# Patient Record
Sex: MALE | Race: WHITE | NOT HISPANIC OR LATINO
[De-identification: names, ages, dates, MRNs, and addresses within clinical notes are randomized per-mention and may not be internally consistent; named-entity substitution may affect disease eponyms.]

---

## 2017-04-11 ENCOUNTER — APPOINTMENT (OUTPATIENT)
Dept: BARIATRICS | Facility: CLINIC | Age: 52
End: 2017-04-11

## 2017-04-11 VITALS
SYSTOLIC BLOOD PRESSURE: 128 MMHG | WEIGHT: 171.96 LBS | BODY MASS INDEX: 26.06 KG/M2 | DIASTOLIC BLOOD PRESSURE: 75 MMHG | HEIGHT: 68 IN | HEART RATE: 73 BPM

## 2018-04-09 ENCOUNTER — APPOINTMENT (OUTPATIENT)
Dept: BARIATRICS | Facility: CLINIC | Age: 53
End: 2018-04-09
Payer: COMMERCIAL

## 2018-04-09 ENCOUNTER — OUTPATIENT (OUTPATIENT)
Dept: OUTPATIENT SERVICES | Facility: HOSPITAL | Age: 53
LOS: 1 days | End: 2018-04-09
Payer: COMMERCIAL

## 2018-04-09 VITALS
SYSTOLIC BLOOD PRESSURE: 122 MMHG | HEART RATE: 61 BPM | DIASTOLIC BLOOD PRESSURE: 74 MMHG | WEIGHT: 175 LBS | HEIGHT: 68 IN | OXYGEN SATURATION: 99 % | BODY MASS INDEX: 26.52 KG/M2

## 2018-04-09 DIAGNOSIS — E66.3 OVERWEIGHT: ICD-10-CM

## 2018-04-09 DIAGNOSIS — Z98.84 BARIATRIC SURGERY STATUS: ICD-10-CM

## 2018-04-09 PROCEDURE — 74220 X-RAY XM ESOPHAGUS 1CNTRST: CPT

## 2018-04-09 PROCEDURE — 99214 OFFICE O/P EST MOD 30 MIN: CPT

## 2018-04-09 PROCEDURE — 74220 X-RAY XM ESOPHAGUS 1CNTRST: CPT | Mod: 26

## 2020-02-04 ENCOUNTER — OUTPATIENT (OUTPATIENT)
Dept: OUTPATIENT SERVICES | Facility: HOSPITAL | Age: 55
LOS: 1 days | End: 2020-02-04
Payer: COMMERCIAL

## 2020-02-04 ENCOUNTER — APPOINTMENT (OUTPATIENT)
Dept: BARIATRICS | Facility: CLINIC | Age: 55
End: 2020-02-04
Payer: COMMERCIAL

## 2020-02-04 VITALS
HEIGHT: 68 IN | WEIGHT: 197.75 LBS | DIASTOLIC BLOOD PRESSURE: 65 MMHG | SYSTOLIC BLOOD PRESSURE: 119 MMHG | BODY MASS INDEX: 29.97 KG/M2 | OXYGEN SATURATION: 98 % | HEART RATE: 56 BPM

## 2020-02-04 DIAGNOSIS — E66.3 OVERWEIGHT: ICD-10-CM

## 2020-02-04 DIAGNOSIS — Z98.84 BARIATRIC SURGERY STATUS: ICD-10-CM

## 2020-02-04 PROCEDURE — 99214 OFFICE O/P EST MOD 30 MIN: CPT | Mod: 25

## 2020-02-04 PROCEDURE — 74220 X-RAY XM ESOPHAGUS 1CNTRST: CPT | Mod: 26

## 2020-02-04 PROCEDURE — 74220 X-RAY XM ESOPHAGUS 1CNTRST: CPT

## 2020-02-04 PROCEDURE — S2083 ADJUSTMENT GASTRIC BAND: CPT

## 2020-02-05 NOTE — HISTORY OF PRESENT ILLNESS
[Date of Surgery: ___] : Date of Surgery:   [unfilled] [Surgeon Name:   ___] : Surgeon Name: Dr. SNIDER [Pre-Op Weight ___] : Pre-op weight was [unfilled] lbs [Procedure: ___] : Procedure performed: [unfilled]  [de-identified] : 55 yo  M s/p lap band surgery APL - with greater curvature plication. Weight gain since last visit on 4/09/2018. Denies any reflux symptoms. No change in BM. No nausea or vomiting. Pt reports an occasional stuck episode with solid food if eating too fast or not chewing ( 1-2 x per month). No food intolerances.Plan to increase daily exercise incorporating  - cardio and strength training.Pt is consuming 3 meals per day and is following nutritional guidelines and believes he is consuming enough daily liquid and protein per day. Denies port site pain. Pt has never had a lap band adjustment post surgery. Pt is requesting a slight lap band adjustment due to decrease in level of restriction and level of satiety between meals.  [de-identified] : Highest weight- 368 lbs

## 2020-02-05 NOTE — PROCEDURE
[FreeTextEntry1] : LAP-BAND adjustment was performed today. Using standard sterile technique, a 20 - gauge Oden needle was inserted into the LAP- BAND port.\par \par The patient was seated erect and tolerated water test without any symptoms of reflux, dysphagia or regurgitation. \par \par Routine post adjustment nutritional counseling was provided. This patient should adhere to a liquid diet for the next 24- 48 hours advancing one stage per day to regular as tolerated.\par \par Under sterile conditions, the port was accessed without complication using manual guidance and non coring needle. On access, fluid was found/measured and adjustment performed without complication. Amount was aspirated again and instilled to confirm placement.  Patient was counseled post adjustment regarding dietary recommendations. Discussed with patient to call or return to the office if any symptoms of over restriction, reflux or other concerns.\par

## 2020-02-05 NOTE — ASSESSMENT
[FreeTextEntry1] : 54 year old M s/p lap band surgery APS- here for follow up visit. Weight gain  since last visit.Decrease in level of restriction from lap band and level of satiety between meals.Occasional stuck episode with solid protein if eating too fast or not chewing fully. \par \par Video Esophagram performed today - No evidence of prolapse. No evidence of obstruction. Horizontal orientation of the lap band. \par \par Band adjustment performed today.Pt instructed to follow liquid diet for next 24- 48 hours then soft foods and advance to solids as tolerated. 1 cc + 1 cc= 2 cc\par \par Nutritional counseling has been provided. The patient is encouraged to remain calorie conscious and continue a low fat, low carbohydrate, protein focus diet. Pt encouraged to participate in a daily exercise regimen incorporating cardio and  strength training. Encouraged to keep a food journal. Discussed being more cognizant - how fast he is eating / chewing food more fully. \par \par Return to office in 2-3 MONTHS or earlier if any concerns. \par

## 2020-02-05 NOTE — DATA REVIEWED
[FreeTextEntry1] :                  1 / 1 Feliberto Ramirez         Report date: 2/4/2020 View Order    (Report matches study selected on Patient History pane)          EXAM: XR ESOPH SNGL CON STUDY       PROCEDURE DATE: 02/04/2020     INTERPRETATION:  EXAM: ESOPHAGUS       PROCEDURE DATE: 2/4/2020   INTERPRETATION: Clinical information: Morbid obesity, bariatric surgery  status   Comparison: 4/9/2018    view demonstrates lap band left upper quadrant, lap band port, left  lower quadrant. This exam was video recorded. Surgical clips present right  upper quadrant   The lap band is horizontally oriented.   Under fluoroscopic observation the patient ingested barium. There was no  evidence of obstruction. Barium flowed through the esophagus, passed the lap  band, and filled the stomach.   There was no evidence of prolapse.   IMPRESSION:   No evidence of prolapse.   No evidence of obstruction.   Horizontal orientation of the lap band.             FELIBERTO STOCK M.D., ATTENDING RADIOLOGIST  This document has been electronically signed. Feb 4 2020 11:01AM

## 2020-02-05 NOTE — REVIEW OF SYSTEMS
[Recent Change In Weight] : ~T recent weight change [Negative] : Heme/Lymph [Fever] : no fever [Chills] : no chills [Dysphagia] : no dysphagia [Hoarseness] : no hoarseness [Chest Pain] : no chest pain [Palpitations] : no palpitations [Shortness Of Breath] : no shortness of breath [Wheezing] : no wheezing [Abdominal Pain] : no abdominal pain [Vomiting] : no vomiting [Diarrhea] : no diarrhea [FreeTextEntry2] : weight gain since last visit. [Reflux/Heartburn] : no reflex/heartburn

## 2020-02-08 ENCOUNTER — TRANSCRIPTION ENCOUNTER (OUTPATIENT)
Age: 55
End: 2020-02-08

## 2023-07-25 ENCOUNTER — NON-APPOINTMENT (OUTPATIENT)
Age: 58
End: 2023-07-25

## 2023-08-02 ENCOUNTER — APPOINTMENT (OUTPATIENT)
Dept: BARIATRICS | Facility: CLINIC | Age: 58
End: 2023-08-02
Payer: COMMERCIAL

## 2023-08-02 ENCOUNTER — OUTPATIENT (OUTPATIENT)
Dept: OUTPATIENT SERVICES | Facility: HOSPITAL | Age: 58
LOS: 1 days | End: 2023-08-02
Payer: COMMERCIAL

## 2023-08-02 VITALS
OXYGEN SATURATION: 97 % | HEART RATE: 62 BPM | SYSTOLIC BLOOD PRESSURE: 120 MMHG | WEIGHT: 258 LBS | TEMPERATURE: 97 F | BODY MASS INDEX: 39.1 KG/M2 | HEIGHT: 68 IN | DIASTOLIC BLOOD PRESSURE: 72 MMHG

## 2023-08-02 DIAGNOSIS — E46 UNSPECIFIED PROTEIN-CALORIE MALNUTRITION: ICD-10-CM

## 2023-08-02 DIAGNOSIS — Z98.84 BARIATRIC SURGERY STATUS: ICD-10-CM

## 2023-08-02 PROCEDURE — 74220 X-RAY XM ESOPHAGUS 1CNTRST: CPT | Mod: 26

## 2023-08-02 PROCEDURE — 99214 OFFICE O/P EST MOD 30 MIN: CPT | Mod: 25

## 2023-08-02 PROCEDURE — S2083 ADJUSTMENT GASTRIC BAND: CPT

## 2023-08-02 PROCEDURE — 99401 PREV MED CNSL INDIV APPRX 15: CPT

## 2023-08-02 PROCEDURE — 74220 X-RAY XM ESOPHAGUS 1CNTRST: CPT

## 2023-08-02 RX ORDER — UBIQUINOL 100 MG
CAPSULE ORAL
Refills: 0 | Status: DISCONTINUED | COMMUNITY
End: 2023-08-02

## 2023-08-02 RX ORDER — BACILLUS COAGULANS/INULIN 1B-250 MG
CAPSULE ORAL
Refills: 0 | Status: DISCONTINUED | COMMUNITY
End: 2023-08-02

## 2023-08-10 NOTE — ASSESSMENT
[FreeTextEntry1] : 58 year old M is s/p Lap band placement with greater curvature plication presents to the office to get back on track. Weight gain since last visit in 2020, attributing to not good food choices. Since last band adjustment in 2020, reports no stuck episodes, reflux, nausea or vomiting. Doing well overall.  VE today demonstrated no obstruction, prolapse or delayed contrast time, but there is mild reflux appreciated  Band adjustment performed today Routine post adjustment nutritional counseling was provided  Greater than 15 minutes spent with pt discussing importance of health maintenance principles including dietary and lifestyle changes as well as long-term weight maintenance. Adhere to a liquid diet for the next 24 hours advancing per day to soft foods then regular as tolerated Encouraged better food choices - maintain food log Zero calorie liquid intake 64 oz per day  Encouraged to participate in a daily exercise regimen incorporating cardio and strength training Track steps - goal approximately 8-10k steps per day Exercises reviewed with pt  Discussed with patient to call the office if any symptoms of over restriction, reflux or other concerns Return to office in next month Follow up with Nutritionist   Pt seen with Dr. Connelly   Additional time spent before and after visit reviewing chart

## 2023-08-10 NOTE — PHYSICAL EXAM
[Normal] : affect appropriate [de-identified] : NC/AT, NAD, well, healthy appearing male [de-identified] : Eyeglasses, EOMI, no conjunctival injection, anicteric  [de-identified] : No visualized JVD or audible bruits  [de-identified] : Equal chest rise, non-labored respirations, no audible wheezing  [de-identified] : Regular rate, no audible carotid bruits or JVD appreciated  [de-identified] : soft, NT, ND, no diastasis or hernias appreciated, incisions from lap band and cholecystectomy well healed port to LUE without erythema, swelling, or tenderness [de-identified] : CINTHYA

## 2023-08-10 NOTE — HISTORY OF PRESENT ILLNESS
[Procedure: ___] : Procedure performed: [unfilled]  [Date of Surgery: ___] : Date of Surgery:   [unfilled] [Surgeon Name:   ___] : Surgeon Name: Dr. SNIDER [Pre-Op Weight ___] : Pre-op weight was [unfilled] lbs [de-identified] : 58 year old M is s/p Lap band placement with greater curvature plication presents to the office to get back on track. Weight gain since last visit in 2020, attributing to not good food choices. Since last band adjustment in 2020, reports no stuck episodes, reflux, nausea or vomiting. Pt had VE earlier today.  Consuming 2-3 meals/day, skipping lunch usually due to busy work schedule and eating too fast. Pt reports snacking after dinner on cake, cookies, or ice cream because will then wake up hungry in the middle of the night. Drinking 32oz/day of zero calorie liquids. 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 Sleeping well. No abdominal pain, constipation or diarrhea.  [de-identified] : Highest weight- 368 lbs  Lap cholecystectomy 2015

## 2023-08-10 NOTE — PROCEDURE
[FreeTextEntry1] : BAND adjustment was performed today. Using standard sterile technique, a 20 - gauge Oden needle was inserted into the LAP- BAND port.  Under sterile conditions, the port was accessed without complication using manual guidance and non-coring needle. On access, approximately 0.5cc of fluid was found/measured. Adjustment was performed without complication. Amount of 1.5cc was filled into band, then aspirated again and instilled to confirm placement. Pt tolerated drinking water after procedure without any difficulty - no reflux, dysphagia or regurgitation.

## 2023-09-19 ENCOUNTER — APPOINTMENT (OUTPATIENT)
Dept: BARIATRICS | Facility: CLINIC | Age: 58
End: 2023-09-19
Payer: COMMERCIAL

## 2023-09-19 VITALS
TEMPERATURE: 96.7 F | OXYGEN SATURATION: 99 % | SYSTOLIC BLOOD PRESSURE: 120 MMHG | HEIGHT: 68 IN | BODY MASS INDEX: 36.37 KG/M2 | HEART RATE: 67 BPM | WEIGHT: 240 LBS | DIASTOLIC BLOOD PRESSURE: 74 MMHG

## 2023-09-19 DIAGNOSIS — E66.9 OBESITY, UNSPECIFIED: ICD-10-CM

## 2023-09-19 PROCEDURE — 99214 OFFICE O/P EST MOD 30 MIN: CPT

## 2023-09-20 ENCOUNTER — APPOINTMENT (OUTPATIENT)
Dept: BARIATRICS/WEIGHT MGMT | Facility: CLINIC | Age: 58
End: 2023-09-20
Payer: COMMERCIAL

## 2023-09-20 VITALS — WEIGHT: 240 LBS | HEIGHT: 68 IN | BODY MASS INDEX: 36.37 KG/M2

## 2023-09-20 DIAGNOSIS — R63.5 ABNORMAL WEIGHT GAIN: ICD-10-CM

## 2023-09-20 PROCEDURE — 97802 MEDICAL NUTRITION INDIV IN: CPT

## 2023-12-12 ENCOUNTER — APPOINTMENT (OUTPATIENT)
Dept: BARIATRICS | Facility: CLINIC | Age: 58
End: 2023-12-12
Payer: COMMERCIAL

## 2023-12-12 VITALS
HEART RATE: 81 BPM | WEIGHT: 216 LBS | DIASTOLIC BLOOD PRESSURE: 76 MMHG | SYSTOLIC BLOOD PRESSURE: 120 MMHG | OXYGEN SATURATION: 98 % | HEIGHT: 68 IN | BODY MASS INDEX: 32.74 KG/M2 | TEMPERATURE: 96.7 F

## 2023-12-12 DIAGNOSIS — E66.9 OBESITY, UNSPECIFIED: ICD-10-CM

## 2023-12-12 DIAGNOSIS — R63.4 ABNORMAL WEIGHT LOSS: ICD-10-CM

## 2023-12-12 PROCEDURE — 99214 OFFICE O/P EST MOD 30 MIN: CPT

## 2023-12-13 PROBLEM — R63.4 WEIGHT LOSS: Status: ACTIVE | Noted: 2023-12-13

## 2023-12-13 PROBLEM — E66.9 OBESITY: Status: ACTIVE | Noted: 2023-08-02

## 2023-12-13 NOTE — HISTORY OF PRESENT ILLNESS
[de-identified] : 58 year old man s/p Lap-Band APS (2014) presents for follow-up. 24 lb weight loss since last visit in September; 2 cc fluid in Band; reports increased restriction and smaller portion sizes since last Band fill in August 2023. Reports no abdominal pain, nausea/vomiting, constipation, diarrhea, reflux/heartburn; reports rare stuck episodes with dry chicken. Patient eating 3 protein-rich meals/day, drinking adequate zero-calorie fluids/day, taking MVI, and walking 7-8k steps/day.  Last nutritionist appt was 9/20/23 [de-identified] : Highest weight- 368 lbs  Lap cholecystectomy 2015

## 2023-12-13 NOTE — ASSESSMENT
[FreeTextEntry1] : 58 year old man s/p Lap-Band (2014) presents for follow-up. 24 lb weight loss since last visit in September; reports smaller portion sizes and increased restriction since last Band fill in August 2023. Nutrition and exercise guidelines reviewed with the patient.    Continue 3 protein-focused meals/day (aim for 60 g - 70 g protein/day); avoid dry foods and chew thoroughly to limit number of stuck episodes Encourage zero calorie fluid intake (64 oz/day) Continue MVI Exercise with cardio (aim for 8k-10k steps/day), and strength training 2x/week Use step counter Weigh yourself 1x-2x/week Try the Calm rubén or Soothing Pod rubén for stress management Follow-up with nutritionist (keep a food log) Follow-up in 3 months All questions answered   Call with any questions or concerns   Time before and after visit spent reviewing chart

## 2023-12-13 NOTE — PHYSICAL EXAM
[de-identified] : No  [de-identified] : soft, NT, ND, no diastasis or hernias appreciated, incisions from lap band and cholecystectomy well healed port to LUE without erythema, swelling, or tenderness

## 2023-12-13 NOTE — REVIEW OF SYSTEMS
[Fever] : no fever [Chills] : no chills [Night Sweats] : no night sweats [Fatigue] : no fatigue [Abdominal Pain] : no abdominal pain [Vomiting] : no vomiting [Constipation] : no constipation [Diarrhea] : no diarrhea [Reflux/Heartburn] : no reflex/heartburn [Hernia] : no hernia [FreeTextEntry2] : 25 lb weight loss since 9/20/23 (2 cc in Lap-Band) [FreeTextEntry7] : rare stuck episodes with dry chicken

## 2024-03-14 ENCOUNTER — APPOINTMENT (OUTPATIENT)
Dept: BARIATRICS | Facility: CLINIC | Age: 59
End: 2024-03-14
Payer: COMMERCIAL

## 2024-03-14 VITALS
DIASTOLIC BLOOD PRESSURE: 90 MMHG | SYSTOLIC BLOOD PRESSURE: 130 MMHG | BODY MASS INDEX: 31.37 KG/M2 | HEIGHT: 68 IN | TEMPERATURE: 97.9 F | HEART RATE: 77 BPM | OXYGEN SATURATION: 97 % | WEIGHT: 207.01 LBS

## 2024-03-14 DIAGNOSIS — Z98.84 BARIATRIC SURGERY STATUS: ICD-10-CM

## 2024-03-14 PROCEDURE — 99214 OFFICE O/P EST MOD 30 MIN: CPT

## 2024-03-20 NOTE — PHYSICAL EXAM
[Normal] : affect appropriate [de-identified] : No  [de-identified] : soft, NT, ND, no diastasis or hernias appreciated, incisions from lap band and cholecystectomy well healed port to LUE without erythema, swelling, or tenderness

## 2024-03-20 NOTE — HISTORY OF PRESENT ILLNESS
[Procedure: ___] : Procedure performed: [unfilled]  [Date of Surgery: ___] : Date of Surgery:   [unfilled] [Surgeon Name:   ___] : Surgeon Name: Dr. SNIDER [Pre-Op Weight ___] : Pre-op weight was [unfilled] lbs [de-identified] : Highest weight- 368 lbs  Lap cholecystectomy 2015 [de-identified] : 58-year-old man s/p Lap-Band APL with greater curvature plication presents for follow-up. 9 lb weight loss since last visit. Reports no abdominal pain, nausea/vomiting, constipation, diarrhea, nor reflux/heartburn; reports occasional stuck episodes with textured foods (e.g. eggplant skin). Patient eating 3 protein-rich meals/day, drinking adequate zero-calorie fluids/day, taking MVI, and exercising.   Last nutritionist appt was 9/20/23

## 2024-03-20 NOTE — ASSESSMENT
[FreeTextEntry1] : 58-year-old man s/p Lap-Band APL with greater curvature plication presents for follow-up. 9 lb weight loss since last visit. Nutrition and exercise guidelines reviewed with the patient.    Continue 3 protein-focused meals/day (aim for 60 g - 70 g protein/day) Encourage zero calorie fluid intake (64 oz/day) Continue MVI Try to chew food thoroughly before swallowing due to occasional stuck episodes/dysphagia Exercise with cardio (aim for 8k-10k steps/day) and strength training 2-3x/week Use step counter Weigh yourself 1x-2x/week Try the Calm rbuén or Soothing Pod rubén for stress management Follow-up with nutritionist (keep a food log) Follow-up in 6 months  All questions answered   Call with any questions or concerns   Time before and after visit spent reviewing chart

## 2024-03-20 NOTE — PHYSICAL EXAM
[Normal] : affect appropriate [de-identified] : No  [de-identified] : soft, NT, ND, no diastasis or hernias appreciated, incisions from lap band and cholecystectomy well healed port to LUE without erythema, swelling, or tenderness

## 2024-03-20 NOTE — REVIEW OF SYSTEMS
[Patient Intake Form Reviewed] : Patient intake form was reviewed [Negative] : Allergic/Immunologic [Abdominal Pain] : no abdominal pain [Diarrhea] : no diarrhea [Constipation] : no constipation [Hernia] : no hernia [Reflux/Heartburn] : no reflex/heartburn [FreeTextEntry7] : reports occasional stuck episodes with textured foods

## 2024-03-20 NOTE — HISTORY OF PRESENT ILLNESS
[Procedure: ___] : Procedure performed: [unfilled]  [Date of Surgery: ___] : Date of Surgery:   [unfilled] [Surgeon Name:   ___] : Surgeon Name: Dr. SNIDER [Pre-Op Weight ___] : Pre-op weight was [unfilled] lbs [de-identified] : Highest weight- 368 lbs  Lap cholecystectomy 2015 [de-identified] : 58-year-old man s/p Lap-Band APL with greater curvature plication presents for follow-up. 9 lb weight loss since last visit. Reports no abdominal pain, nausea/vomiting, constipation, diarrhea, nor reflux/heartburn; reports occasional stuck episodes with textured foods (e.g. eggplant skin). Patient eating 3 protein-rich meals/day, drinking adequate zero-calorie fluids/day, taking MVI, and exercising.   Last nutritionist appt was 9/20/23

## 2024-03-20 NOTE — ASSESSMENT
[FreeTextEntry1] : 58-year-old man s/p Lap-Band APL with greater curvature plication presents for follow-up. 9 lb weight loss since last visit. Nutrition and exercise guidelines reviewed with the patient.    Continue 3 protein-focused meals/day (aim for 60 g - 70 g protein/day) Encourage zero calorie fluid intake (64 oz/day) Continue MVI Try to chew food thoroughly before swallowing due to occasional stuck episodes/dysphagia Exercise with cardio (aim for 8k-10k steps/day) and strength training 2-3x/week Use step counter Weigh yourself 1x-2x/week Try the Calm rubén or Soothing Pod rubén for stress management Follow-up with nutritionist (keep a food log) Follow-up in 6 months  All questions answered   Call with any questions or concerns   Time before and after visit spent reviewing chart

## 2024-09-12 ENCOUNTER — APPOINTMENT (OUTPATIENT)
Dept: BARIATRICS | Facility: CLINIC | Age: 59
End: 2024-09-12